# Patient Record
Sex: FEMALE | URBAN - METROPOLITAN AREA
[De-identification: names, ages, dates, MRNs, and addresses within clinical notes are randomized per-mention and may not be internally consistent; named-entity substitution may affect disease eponyms.]

---

## 2024-04-30 ENCOUNTER — APPOINTMENT (RX ONLY)
Dept: URBAN - METROPOLITAN AREA CLINIC 120 | Facility: CLINIC | Age: 29
Setting detail: DERMATOLOGY
End: 2024-04-30

## 2024-04-30 DIAGNOSIS — L40.8 OTHER PSORIASIS: ICD-10-CM

## 2024-04-30 DIAGNOSIS — R21 RASH AND OTHER NONSPECIFIC SKIN ERUPTION: ICD-10-CM

## 2024-04-30 PROCEDURE — ? COUNSELING

## 2024-04-30 PROCEDURE — 99204 OFFICE O/P NEW MOD 45 MIN: CPT

## 2024-04-30 PROCEDURE — ? PRESCRIPTION

## 2024-04-30 PROCEDURE — ? PRESCRIPTION MEDICATION MANAGEMENT

## 2024-04-30 RX ORDER — FLUCONAZOLE 200 MG/1
1 TABLET ORAL BIW
Qty: 4 | Refills: 0 | Status: ERX | COMMUNITY
Start: 2024-04-30

## 2024-04-30 RX ORDER — KETOCONAZOLE 20 MG/ML
THIN FILM SHAMPOO, SUSPENSION TOPICAL BIW
Qty: 120 | Refills: 6 | Status: ERX | COMMUNITY
Start: 2024-04-30

## 2024-04-30 RX ORDER — CLOBETASOL PROPIONATE 0.5 MG/ML
THIN FILM SOLUTION TOPICAL BID
Qty: 100 | Refills: 6 | Status: ERX | COMMUNITY
Start: 2024-04-30

## 2024-04-30 RX ADMIN — CLOBETASOL PROPIONATE THIN FILM: 0.5 SOLUTION TOPICAL at 00:00

## 2024-04-30 RX ADMIN — FLUCONAZOLE 1: 200 TABLET ORAL at 00:00

## 2024-04-30 RX ADMIN — KETOCONAZOLE THIN FILM: 20 SHAMPOO, SUSPENSION TOPICAL at 00:00

## 2024-04-30 ASSESSMENT — LOCATION SIMPLE DESCRIPTION DERM
LOCATION SIMPLE: RIGHT BREAST
LOCATION SIMPLE: SCALP
LOCATION SIMPLE: CHEST
LOCATION SIMPLE: RIGHT SCALP

## 2024-04-30 ASSESSMENT — LOCATION DETAILED DESCRIPTION DERM
LOCATION DETAILED: RIGHT CENTRAL FRONTAL SCALP
LOCATION DETAILED: RIGHT MEDIAL BREAST 2-3:00 REGION
LOCATION DETAILED: LEFT MEDIAL SUPERIOR CHEST
LOCATION DETAILED: LEFT SUPERIOR PARIETAL SCALP

## 2024-04-30 ASSESSMENT — LOCATION ZONE DERM
LOCATION ZONE: SCALP
LOCATION ZONE: TRUNK

## 2024-04-30 NOTE — PROCEDURE: PRESCRIPTION MEDICATION MANAGEMENT
Detail Level: Zone
Render In Strict Bullet Format?: No
Initiate Treatment: ketoconazole 2 % shampoo BIW\\nQuantity: 120.0 ml\\nSig: Apply shampoo daily to the affected areas on the scalp let sit 5-10 min then rinse until clear. Then apply 2-3 times a week for maintenance\\n\\nclobetasol 0.05 % scalp solution Bid\\nQuantity: 100.0 ml\\nSig: Apply to scalp bid as needed.\\n\\nfluconazole 200 mg tablet BIW\\nQuantity: 4.0 Tablet\\nSig: Take 1 tablet on Sundays and Thursdays for 2 weeks.
Initiate Treatment: Ketoconazole and hydrocortisone and fluconazole as above

## 2024-05-28 ENCOUNTER — APPOINTMENT (RX ONLY)
Dept: URBAN - METROPOLITAN AREA CLINIC 120 | Facility: CLINIC | Age: 29
Setting detail: DERMATOLOGY
End: 2024-05-28

## 2024-05-28 DIAGNOSIS — L40.8 OTHER PSORIASIS: ICD-10-CM

## 2024-05-28 DIAGNOSIS — R21 RASH AND OTHER NONSPECIFIC SKIN ERUPTION: ICD-10-CM

## 2024-05-28 PROCEDURE — ? PRESCRIPTION MEDICATION MANAGEMENT

## 2024-05-28 PROCEDURE — 99214 OFFICE O/P EST MOD 30 MIN: CPT

## 2024-05-28 PROCEDURE — ? COUNSELING

## 2024-05-28 ASSESSMENT — LOCATION SIMPLE DESCRIPTION DERM
LOCATION SIMPLE: RIGHT SCALP
LOCATION SIMPLE: SCALP
LOCATION SIMPLE: CHEST
LOCATION SIMPLE: RIGHT BREAST

## 2024-05-28 ASSESSMENT — LOCATION ZONE DERM
LOCATION ZONE: TRUNK
LOCATION ZONE: SCALP

## 2024-05-28 ASSESSMENT — LOCATION DETAILED DESCRIPTION DERM
LOCATION DETAILED: RIGHT MEDIAL BREAST 2-3:00 REGION
LOCATION DETAILED: LEFT MEDIAL SUPERIOR CHEST
LOCATION DETAILED: LEFT SUPERIOR PARIETAL SCALP
LOCATION DETAILED: RIGHT CENTRAL FRONTAL SCALP

## 2024-05-28 NOTE — PROCEDURE: PRESCRIPTION MEDICATION MANAGEMENT
Continue Regimen: ketoconazole 2 % shampoo BIW\\nQuantity: 120.0 ml\\nSig: Apply shampoo daily to the affected areas on the scalp let sit 5-10 min then rinse until clear. Then apply 2-3 times a week for maintenance\\n\\nclobetasol 0.05 % scalp solution Bid\\nQuantity: 100.0 ml\\nSig: Apply to scalp bid as needed.
Discontinue Regimen: fluconazole 200 mg tablet BIW\\nQuantity: 4.0 Tablet\\nSig: Take 1 tablet on Sundays and Thursdays for 2 weeks.
Detail Level: Zone
Render In Strict Bullet Format?: No
Continue Regimen: Ketoconazole and hydrocortisone bidprn